# Patient Record
Sex: FEMALE | Employment: FULL TIME | URBAN - METROPOLITAN AREA
[De-identification: names, ages, dates, MRNs, and addresses within clinical notes are randomized per-mention and may not be internally consistent; named-entity substitution may affect disease eponyms.]

---

## 2022-09-21 ENCOUNTER — OFFICE VISIT (OUTPATIENT)
Dept: ORTHOPEDIC SURGERY | Age: 42
End: 2022-09-21
Payer: COMMERCIAL

## 2022-09-21 VITALS — WEIGHT: 184 LBS | HEIGHT: 64 IN | BODY MASS INDEX: 31.41 KG/M2 | OXYGEN SATURATION: 100 % | RESPIRATION RATE: 16 BRPM

## 2022-09-21 DIAGNOSIS — S63.641A SPRAIN OF METACARPOPHALANGEAL (MCP) JOINT OF RIGHT THUMB, INITIAL ENCOUNTER: Primary | ICD-10-CM

## 2022-09-21 DIAGNOSIS — M79.641 RIGHT HAND PAIN: Primary | ICD-10-CM

## 2022-09-21 PROCEDURE — 99202 OFFICE O/P NEW SF 15 MIN: CPT | Performed by: ORTHOPAEDIC SURGERY

## 2022-09-21 NOTE — PROGRESS NOTES
Chief Complaint   Patient presents with    Hand Pain     [facility]- Right hand, thumb/nail injury doi 9/18/22
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
## 2022-09-28 ENCOUNTER — OFFICE VISIT (OUTPATIENT)
Dept: ORTHOPEDIC SURGERY | Age: 42
End: 2022-09-28
Payer: COMMERCIAL

## 2022-09-28 VITALS — HEIGHT: 64 IN | WEIGHT: 184 LBS | RESPIRATION RATE: 16 BRPM | BODY MASS INDEX: 31.41 KG/M2 | OXYGEN SATURATION: 100 %

## 2022-09-28 DIAGNOSIS — S63.641D SPRAIN OF METACARPOPHALANGEAL (MCP) JOINT OF RIGHT THUMB, SUBSEQUENT ENCOUNTER: Primary | ICD-10-CM

## 2022-09-28 PROCEDURE — 99212 OFFICE O/P EST SF 10 MIN: CPT | Performed by: ORTHOPAEDIC SURGERY

## 2022-09-28 NOTE — PROGRESS NOTES
This patient for sustained a strain of the MP joint of the right thumb on 9/18/2022 is seen here in follow-up. The patient has been removing her brace for immobilization intermittently. She says still has some pain at the MP joint. Today on examination she still has slight tenderness over the MP joint on the ulnar aspect. She has almost 80% of the motion. Her nail is stable and was nontender. Diagnosis: Strain MP joint left thumb. Treatment: Discussed with her that she should discontinue the use of the splint and start doing normal activities and may return to work next Monday and will see her as needed.

## 2022-09-28 NOTE — LETTER
[address] [place] [address] and [employer] [address] [address]  Phone: [phone]  Fax: [phone]    Maame Ridley MD        September 28, 2022     Patient: Cecy Conti   YOB: 1980   Date of Visit: 9/28/2022       To Whom It May Concern: It is my medical opinion that Cecy Conti may return to work on 10/03/2022 to full duty with no restrictions. .    If you have any questions or concerns, please don't hesitate to call.     Sincerely,        Maame Ridley MD